# Patient Record
Sex: MALE | Race: OTHER | URBAN - METROPOLITAN AREA
[De-identification: names, ages, dates, MRNs, and addresses within clinical notes are randomized per-mention and may not be internally consistent; named-entity substitution may affect disease eponyms.]

---

## 2017-06-29 ENCOUNTER — EMERGENCY (EMERGENCY)
Facility: HOSPITAL | Age: 27
LOS: 1 days | Discharge: HOME | End: 2017-06-29
Attending: EMERGENCY MEDICINE | Admitting: EMERGENCY MEDICINE
Payer: SELF-PAY

## 2017-06-29 VITALS
RESPIRATION RATE: 20 BRPM | SYSTOLIC BLOOD PRESSURE: 134 MMHG | OXYGEN SATURATION: 95 % | HEIGHT: 69 IN | HEART RATE: 84 BPM | DIASTOLIC BLOOD PRESSURE: 88 MMHG | TEMPERATURE: 99 F

## 2017-06-29 PROCEDURE — 99284 EMERGENCY DEPT VISIT MOD MDM: CPT

## 2017-06-29 PROCEDURE — 73610 X-RAY EXAM OF ANKLE: CPT | Mod: 26,LT

## 2017-06-29 PROCEDURE — 72100 X-RAY EXAM L-S SPINE 2/3 VWS: CPT | Mod: 26

## 2017-06-29 RX ORDER — KETOROLAC TROMETHAMINE 30 MG/ML
60 SYRINGE (ML) INJECTION ONCE
Qty: 0 | Refills: 0 | Status: DISCONTINUED | OUTPATIENT
Start: 2017-06-29 | End: 2017-06-29

## 2017-06-29 RX ADMIN — Medication 60 MILLIGRAM(S): at 20:36

## 2017-06-29 NOTE — ED PROVIDER NOTE - MEDICAL DECISION MAKING DETAILS
27 year old male with acute left ankle pain and swelling after fall, DDx includes sprain v fracture, already has ice pack, will give ibuprofen, get x ray, and reassess

## 2017-06-29 NOTE — ED PROVIDER NOTE - ATTENDING CONTRIBUTION TO CARE
I have seen and evaluated this patient with theresident.   I agree with the findings  unless other wise stated. I have amended notes where needed.  After my face to face bedside evaluation, I am [age] year old male with acute left ankle pain and swelling after fall, DDx includes sprain v fracture, already has ice pack, will give ibuprofen, get x ray, and reassessed

## 2017-06-29 NOTE — ED PROVIDER NOTE - PLAN OF CARE
Follow up with orthopedics in 1-2 days.  (See list attached)  Rest, Ice 3-4 x a day x 48hours, elevate ankle, ace/aircast.  Use crutches to ambulate.  Take Motrin 600mg orally every 8 hours as needed for pain take with food.  Return to the ER for any persistent/worsening or new symptoms weakness, numbness or any concerning symptoms.

## 2017-06-29 NOTE — ED PROVIDER NOTE - MUSCULOSKELETAL MINIMAL EXAM
TENDERNESS/normal range of motion/left paraspinal tenderness. Swelling and tenderness to lateral posterior malleolus of left ankle

## 2017-06-29 NOTE — ED ADULT NURSE NOTE - OBJECTIVE STATEMENT
pt is a 27 yr M presents with L ankle inj after falling off a truck. denies hitting head or LOC. +swelling and pain.

## 2017-06-29 NOTE — ED PROCEDURE NOTE - CPROC ED POST PROC CARE GUIDE1
Instructed patient/caregiver regarding signs and symptoms of infection./Instructed patient/caregiver to follow-up with primary care physician./Keep the cast/splint/dressing clean and dry./Verbal/written post procedure instructions were given to patient/caregiver./Elevate the injured extremity as instructed.

## 2017-06-29 NOTE — ED PROVIDER NOTE - OBJECTIVE STATEMENT
27 year old male with no significant PMHx presents with left ankle pain after an inversion injury 2 hours prior to arrival - he jumped off a truck. He also has left sided back pain that is not as severe as the ankle. Did not take anything before coming in. 27 year old male with no significant PMHx presents with left ankle pain after an inversion injury 2 hours prior to arrival - he jumped off a truck. He also has left sided back pain that is not as severe as the ankle. Did not take anything before coming in. Pt apparently fell off a truck rear while unloading about 5 feet height landed left ankle c/o lower back pain and lt ankle pain no LOC no neck pain
